# Patient Record
(demographics unavailable — no encounter records)

---

## 2018-02-02 NOTE — RAD
TWO VIEWS OF THE RIGHT HIP

2/2/18

 

HISTORY: 

Right leg pain that began yesterday after jumping in PE class.

 

FINDINGS:  

Two views of the right hip shows no evidence of fracture or dislocation. No degenerative changes are 
seen. No focal soft tissue swelling is present. 

 

IMPRESSION:  

Unremarkable exam. 

 

POS: [facility]

## 2018-02-02 NOTE — RAD
THREE VIEWS OF LUMBOSACRAL SPINE

2/2/18

 

COMPARISON:  

None.

 

HISTORY: 

Right leg pain that began yesterday after jumping in PE class. 

 

FINDINGS:  

Two views lumbosacral spine shows normal height and alignment of the vertebral bodies and interverteb
ral discs without fracture or subluxation. No degenerative changes are seen.

 

IMPRESSION:  

Unremarkable exam. 

 

POS: [facility]

## 2018-02-02 NOTE — RAD
SINGLE VIEW OF THE PELVIS

2/2/18

 

COMPARISON:  

None.

 

HISTORY: 

Right leg pain after jumping in PE class yesterday. 

 

FINDINGS:  

Single view of the pelvis shows no evidence of acute fracture or dislocation. No significant hip abno
rmality is seen on either side. No soft tissue swelling is seen. 

 

IMPRESSION:  

No evidence of acute osseous abnormality. 

 

POS: [facility]